# Patient Record
Sex: MALE | Race: WHITE
[De-identification: names, ages, dates, MRNs, and addresses within clinical notes are randomized per-mention and may not be internally consistent; named-entity substitution may affect disease eponyms.]

---

## 2019-11-05 ENCOUNTER — HOSPITAL ENCOUNTER (EMERGENCY)
Dept: HOSPITAL 47 - EC | Age: 49
Discharge: HOME | End: 2019-11-05
Payer: COMMERCIAL

## 2019-11-05 VITALS
DIASTOLIC BLOOD PRESSURE: 86 MMHG | TEMPERATURE: 98.7 F | RESPIRATION RATE: 18 BRPM | HEART RATE: 102 BPM | SYSTOLIC BLOOD PRESSURE: 138 MMHG
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
## 2019-11-05 DIAGNOSIS — Z23: ICD-10-CM

## 2019-11-05 DIAGNOSIS — Z88.1: ICD-10-CM

## 2019-11-05 DIAGNOSIS — S01.81XA: Primary | ICD-10-CM

## 2019-11-05 PROCEDURE — 90715 TDAP VACCINE 7 YRS/> IM: CPT

## 2019-11-05 PROCEDURE — 99283 EMERGENCY DEPT VISIT LOW MDM: CPT

## 2019-11-05 PROCEDURE — 12013 RPR F/E/E/N/L/M 2.6-5.0 CM: CPT

## 2019-11-05 PROCEDURE — 90471 IMMUNIZATION ADMIN: CPT

## 2019-11-05 NOTE — ED
General Adult HPI





- General


Source: patient, RN notes reviewed, old records reviewed


Mode of arrival: ambulatory


Limitations: no limitations





<Barry Marsh - Last Filed: 11/05/19 21:55>





<Venice Perkins - Last Filed: 11/08/19 01:10>





- General


Chief complaint: Wound/Laceration


Stated complaint: Chin Laceration


Time Seen by Provider: 11/05/19 21:18





- History of Present Illness


Initial comments: 


49-year-old male patient resents to ED with chief complaint of laceration.  

Patient reports that he was hit in the face with a hockey puck in his chin 

region while playing.  Does not know date of last tetanus.  No jaw pain.  No 

loss of consciousness.  No blood thinners.





Systemic: Pt denies fatigue, fever/chills, rash. Pt denies weakness, night 

sweats, weight loss. 


Neuro: Pt denies headache, visual disturbances, syncope or pre-syncope.


HEENT: Pt denies ocular discharge or irritation, otalgia, rhinorrhea, pharyngi

tis or notable lymphadenopathy. 


Cardiopulmonary: Pt denies chest pain, SOB, heart palpitations, dyspnea on 

exertion.  


Abdominal/GI: Pt denies abdominal pain, n/v/d. 


: Pt denies dysuria, burning w/ urination, frequency/urgency. Denies new onset

urinary or bowel incontinence.  


MSK: Pt denies myalgia, loss of strength or function in extremities. 


Neuro: Pt denies new onset weakness, paresthesias. 


 (Barry Marsh)





- Related Data


                                Home Medications











 Medication  Instructions  Recorded  Confirmed


 


No Known Home Medications  11/05/19 11/05/19











                                    Allergies











Allergy/AdvReac Type Severity Reaction Status Date / Time


 


levofloxacin [From Levaquin] Allergy  Nausea Verified 11/05/19 21:17














Review of Systems


ROS Other: All systems not noted in ROS Statement are negative.





<Barry Marsh - Last Filed: 11/05/19 21:55>


ROS Other: All systems not noted in ROS Statement are negative.





<Venice Perkins - Last Filed: 11/08/19 01:10>


ROS Statement: 


Those systems with pertinent positive or pertinent negative responses have been 

documented in the HPI.








Past Medical History


Past Medical History: No Reported History


History of Any Multi-Drug Resistant Organisms: None Reported


Past Surgical History: No Surgical Hx Reported


Past Psychological History: No Psychological Hx Reported


Smoking Status: Never smoker


Past Alcohol Use History: None Reported


Past Drug Use History: None Reported





<MaximoBarry JAI - Last Filed: 11/05/19 21:55>





General Exam


Limitations: no limitations





<Barry Marsh - Last Filed: 11/05/19 21:55>





- General Exam Comments


Initial Comments: 








Constitutional: NAD, AOX3, Pt has pleasant affect. 


HEENT: NC/AT, trachea midline, neck supple, no lymphadenopathy. Posterior 

pharynx non erythematous, without exudates. External ears appear normal, without

discharge. Mucous membranes moist. Eyes PERRLA, EOM intact. There is no scleral 

icterus. No pallor noted. 


Cardiopulmonary: RRR, no murmurs, rubs or gallops, no JVD noted. Lungs CTAB in 

anterior and posterior fields. No peripheral edema. 


Abdominal exam: Abdomen soft and non-distended. Abdomen non-tender to palpation 

in all 4 quadrants. Bowel sounds active in LLQ. No hepatosplenomegaly. No 

ecchymosis


Neuro: CN II-XII grossly intact. No nuchal rigidity. No raccon eyes, no guy 

sign, no hemotympanum. No cervical spinal tenderness. 


MSK: 3 cm laceration left chin region irrigated approximated with 5 simple 

interrupted sutures.  Full active range of motion of jaw able to bite down on 

stick without difficulty.  No posterior calf tenderness bilaterally, homans sign

negative bilaterally. Posterior tibialis and radial pulse +2 bilaterally. 

Sensation intact in upper and lower extremities. Full active ROM in upper and 

lower extremities, 5/5 stregnth. 





 (Barry Marsh)





Course





                                   Vital Signs











  11/05/19 11/05/19 11/05/19





  21:18 21:52 21:54


 


Temperature 98.7 F 98.7 F 98.7 F


 


Pulse Rate 102 H 102 H 102 H


 


Respiratory 18 18 18





Rate   


 


Blood Pressure 138/86 138/86 138/86


 


O2 Sat by Pulse 96 96 96





Oximetry   














Procedures





- Laceration


  ** Laceration #1


Consent Obtained: verbal consent


Site: face


Size (cm): 3


Description: linear


Depth: simple, single layer


Anesthetic Used: lidocaine 1%


Anesthesia Technique: local infiltration


Amount (mls): 2


Pre-repair: wound explored, irrigated extensively, deep structures intact


Type of Sutures: nylon


Size of Sutures: 5-0


Number of Sutures: 5


Technique: simple, interrupted


Patient Tolerated Procedure: well, no complications





<Barry Marsh - Last Filed: 11/05/19 21:55>





Medical Decision Making





<Barry Marsh - Last Filed: 11/05/19 21:55>





<Venice Perkins - Last Filed: 11/08/19 01:10>





- Medical Decision Making





49-year-old male patient presents to the chief complaint of laceration.  Was hit

in the chin with a hockey puck.  3 cm laceration.  Irrigated approximated with 5

simple interrupted sutures.  Tired procedure well.  Discharged with return 

precautions.  Case discussed with Dr. Perkins. 








 (Barry Marsh)


I was available for consultation in the emergency department.  The history and 

physical exam were done by the midlevel provider. I was consulted for this 

patients care. I reviewed the case with the midlevel provider and based on 

their presentation of the patient, I agree with the assessment, medical decision

making and plan of care as documented. 


Chart was dictated using Dragon dictation software.  Attempts were made to 

correct any dictation errors however some typographical errors may persist. 


 (Venice Perkins)





Disposition


Is patient prescribed a controlled substance at d/c from ED?: No





<Barry Marsh - Last Filed: 11/05/19 21:55>





<Venice Perkins - Last Filed: 11/08/19 01:10>


Clinical Impression: 


 Laceration





Disposition: HOME SELF-CARE


Condition: Stable


Instructions (If sedation given, give patient instructions):  Care For Your 

Stitches (ED), Laceration (ED)


Additional Instructions: 


Patient to adhere to previously discussed treatment plan and will take 

medication(s) as directed. Patient to follow up with PCP in 1-2 days. Patient to

return to ED if symptoms do not improve. 





Please return for suture removal: 





Hand: 7-10 days


Face: 5 days


Chest/abdomen: 12-14 days


Extremities: 7-10 days


Scalp: 7 days 


Eyebrow: 5-7 days


Foot/sole: 12-14 days 





Please monitor for signs and symptoms of infection including: redness, warmth, 

drainage, discharge. 





Please return to ED if these signs or symptoms occur, new signs or symptoms 

develop or if condition worsens in anyway. 


Referrals: 


CARMELINA Barakat III, MD [Primary Care Provider] - 1-2 days

## 2020-09-16 ENCOUNTER — HOSPITAL ENCOUNTER (OUTPATIENT)
Dept: HOSPITAL 47 - RADUSWWP | Age: 50
Discharge: HOME | End: 2020-09-16
Attending: FAMILY MEDICINE
Payer: COMMERCIAL

## 2020-09-16 DIAGNOSIS — R13.13: Primary | ICD-10-CM

## 2020-09-16 PROCEDURE — 76536 US EXAM OF HEAD AND NECK: CPT

## 2020-09-16 NOTE — US
EXAMINATION TYPE: US thyroid st tissue head/neck

 

DATE OF EXAM: 9/16/2020

 

COMPARISON: NONE

 

CLINICAL HISTORY: R13.13 Dysphagia.

 

GLAND SIZE:

 

Right Lobe: 5.4 x 1.9 x 1.7 cm

** Overall Parenchyma:  homogenous

Left Lobe: 4.6 x 1.6 x 1.5 cm

** Overall Parenchyma:  homogeneous

Isthmus Thickness:  0.2 cm

 

NODULES

 

RIGHT:   # of nodules measured on right: 0

 

 

LEFT:    # of nodules measured on left:  0

 

 

ISTHMUS:    # of nodules measured in the isthmus:  0

 

 

Bilateral neck scanned, no evidence of lymphadenopathy.

 

Right thyroid lobe is enlarged 

 

 

 

IMPRESSION:

1. Correlate for thyromegaly. 

2. No suspicious thyroid nodules

## 2021-11-02 ENCOUNTER — HOSPITAL ENCOUNTER (OUTPATIENT)
Dept: HOSPITAL 47 - ORWHC2ENDO | Age: 51
Discharge: HOME | End: 2021-11-02
Attending: SURGERY
Payer: COMMERCIAL

## 2021-11-02 VITALS — SYSTOLIC BLOOD PRESSURE: 117 MMHG | HEART RATE: 52 BPM | DIASTOLIC BLOOD PRESSURE: 80 MMHG

## 2021-11-02 VITALS — RESPIRATION RATE: 16 BRPM | TEMPERATURE: 97.9 F

## 2021-11-02 VITALS — BODY MASS INDEX: 24.4 KG/M2

## 2021-11-02 DIAGNOSIS — Z87.891: ICD-10-CM

## 2021-11-02 DIAGNOSIS — Z12.11: Primary | ICD-10-CM

## 2021-11-02 DIAGNOSIS — Z80.0: ICD-10-CM

## 2021-11-02 DIAGNOSIS — Z88.1: ICD-10-CM

## 2021-11-02 PROCEDURE — 45378 DIAGNOSTIC COLONOSCOPY: CPT

## 2021-11-02 NOTE — P.GSHP
History of Present Illness


H&P Date: 11/02/21


Chief Complaint: Colon cancer screening





Patient here today for colonoscopy.  He has not had 1 previously.  No bowel 

complaints.  Family history of colon cancer and a aunts.





Past Medical History


Past Medical History: No Reported History


History of Any Multi-Drug Resistant Organisms: None Reported


Past Surgical History: No Surgical Hx Reported


Past Anesthesia/Blood Transfusion Reactions: No Reported Reaction


Smoking Status: Former smoker





- Past Family History


  ** Mother


Family Medical History: No Reported History





Medications and Allergies


                                Home Medications











 Medication  Instructions  Recorded  Confirmed  Type


 


No Known Home Medications  11/05/19 11/02/21 History








                                    Allergies











Allergy/AdvReac Type Severity Reaction Status Date / Time


 


levofloxacin [From Levaquin] Allergy  Nausea Verified 11/02/21 10:58














Surgical - Exam


                                   Vital Signs











Temp Pulse Resp BP Pulse Ox


 


 97.9 F   65   16   152/84   98 


 


 11/02/21 11:02  11/02/21 11:02  11/02/21 11:02  11/02/21 11:02  11/02/21 11:02

















Physical exam:


General: Well-developed, well-nourished


HEENT: Normocephalic, sclerae nonicteric


Abdomen: Nontender, nondistended


Extremities: No edema


Neuro: Alert and oriented





Assessment and Plan


(1) Colon cancer screening


Narrative/Plan: 


Will proceed with colonoscopy


Current Visit: Yes   Status: Acute   Code(s): Z12.11 - ENCOUNTER FOR SCREENING 

FOR MALIGNANT NEOPLASM OF COLON   SNOMED Code(s): 035757015

## 2021-11-02 NOTE — P.PCN
Date of Procedure: 11/02/21


Procedure(s) Performed: 


PREOPERATIVE DIAGNOSIS: Colon cancer screening


POSTOPERATIVE DIAGNOSIS: Normal exam


PROCEDURE: Colonoscopy 


ANESTHESIA: MAC


SURGEON: Kyle Dior M.D.


SPECIMENS: None


ENDOSCOPIC PROCEDURE:  The patient was placed on the endoscopy table in the left

decubitus position.  The Olympus colonoscope was inserted into the anus and 

passed under direct visualization to the base of the cecum.  The appendiceal 

orifice was visualized.  From that point the scope was slowly withdrawn inspecti

ng all surfaces carefully.  There were no neoplastic inflammatory or polypoid 

lesions throughout the cecum, ascending, transverse, descending, sigmoid and 

rectum.  There was no visible diverticulosis noted.  The patient's colon was 

slightly tortuous.  Digital rectal examination was normal.  The patient was 

taken to the recovery room in stable condition per anesthesia guidelines.


RECOMMENDATIONS: Resume diet.  Follow-up colonoscopy in 10 years.

## 2024-09-20 ENCOUNTER — HOSPITAL ENCOUNTER (OUTPATIENT)
Dept: HOSPITAL 47 - LABWHC1 | Age: 54
Discharge: HOME | End: 2024-09-20
Attending: FAMILY MEDICINE
Payer: COMMERCIAL

## 2024-09-20 DIAGNOSIS — Z00.00: Primary | ICD-10-CM

## 2024-09-20 DIAGNOSIS — Z13.29: ICD-10-CM

## 2024-09-20 DIAGNOSIS — Z13.220: ICD-10-CM

## 2024-09-20 DIAGNOSIS — Z12.5: ICD-10-CM

## 2024-09-20 DIAGNOSIS — E83.52: ICD-10-CM

## 2024-09-20 LAB
ALBUMIN SERPL-MCNC: 4.6 G/DL (ref 3.8–4.9)
ALBUMIN/GLOB SERPL: 2.42 RATIO (ref 1.6–3.17)
ALP SERPL-CCNC: 53 U/L (ref 41–126)
ALT SERPL-CCNC: 32 U/L (ref 10–49)
ANION GAP SERPL CALC-SCNC: 8.1 MMOL/L (ref 4–12)
AST SERPL-CCNC: 35 U/L (ref 14–35)
BASOPHILS # BLD AUTO: 0.07 X 10*3/UL (ref 0–0.1)
BASOPHILS NFR BLD AUTO: 1.1 %
BUN SERPL-SCNC: 14.3 MG/DL (ref 9–27)
BUN/CREAT SERPL: 14.3 RATIO (ref 12–20)
CALCIUM SPEC-MCNC: 10.2 MG/DL (ref 8.7–10.3)
CHLORIDE SERPL-SCNC: 104 MMOL/L (ref 96–109)
CHOLEST SERPL-MCNC: 212 MG/DL (ref 0–200)
CO2 SERPL-SCNC: 26.9 MMOL/L (ref 21.6–31.8)
EOSINOPHIL # BLD AUTO: 0.45 X 10*3/UL (ref 0.04–0.35)
EOSINOPHIL NFR BLD AUTO: 7 %
ERYTHROCYTE [DISTWIDTH] IN BLOOD BY AUTOMATED COUNT: 4.47 X 10*6/UL (ref 4.4–5.6)
ERYTHROCYTE [DISTWIDTH] IN BLOOD: 12.2 % (ref 11.5–14.5)
GLOBULIN SER CALC-MCNC: 1.9 G/DL (ref 1.6–3.3)
GLUCOSE SERPL-MCNC: 104 MG/DL (ref 70–110)
HCT VFR BLD AUTO: 42 % (ref 39.6–50)
HDLC SERPL-MCNC: 59.6 MG/DL (ref 40–60)
HGB BLD-MCNC: 14.6 G/DL (ref 13–17)
IMM GRANULOCYTES BLD QL AUTO: 0.2 %
LDLC SERPL CALC-MCNC: 140 MG/DL (ref 0–131)
LYMPHOCYTES # SPEC AUTO: 2.97 X 10*3/UL (ref 0.9–5)
LYMPHOCYTES NFR SPEC AUTO: 46 %
MCH RBC QN AUTO: 32.7 PG (ref 27–32)
MCHC RBC AUTO-ENTMCNC: 34.8 G/DL (ref 32–37)
MCV RBC AUTO: 94 FL (ref 80–97)
MONOCYTES # BLD AUTO: 0.74 X 10*3/UL (ref 0.2–1)
MONOCYTES NFR BLD AUTO: 11.5 %
NEUTROPHILS # BLD AUTO: 2.21 X 10*3/UL (ref 1.8–7.7)
NEUTROPHILS NFR BLD AUTO: 34.2 %
NRBC BLD AUTO-RTO: 0 X 10*3/UL (ref 0–0.01)
PLATELET # BLD AUTO: 285 X 10*3/UL (ref 140–440)
POTASSIUM SERPL-SCNC: 4.8 MMOL/L (ref 3.5–5.5)
PROT SERPL-MCNC: 6.5 G/DL (ref 6.2–8.2)
SODIUM SERPL-SCNC: 139 MMOL/L (ref 135–145)
TRIGL SERPL-MCNC: 61.9 MG/DL (ref 0–149)
VLDLC SERPL CALC-MCNC: 12.38 MG/DL (ref 5–40)
WBC # BLD AUTO: 6.45 X 10*3/UL (ref 4.5–10)

## 2024-09-20 PROCEDURE — 36415 COLL VENOUS BLD VENIPUNCTURE: CPT

## 2024-09-20 PROCEDURE — 80061 LIPID PANEL: CPT

## 2024-09-20 PROCEDURE — 85025 COMPLETE CBC W/AUTO DIFF WBC: CPT

## 2024-09-20 PROCEDURE — 80053 COMPREHEN METABOLIC PANEL: CPT

## 2024-09-20 PROCEDURE — 84443 ASSAY THYROID STIM HORMONE: CPT

## 2024-09-20 PROCEDURE — 83970 ASSAY OF PARATHORMONE: CPT
